# Patient Record
Sex: FEMALE | Race: WHITE | NOT HISPANIC OR LATINO | URBAN - METROPOLITAN AREA
[De-identification: names, ages, dates, MRNs, and addresses within clinical notes are randomized per-mention and may not be internally consistent; named-entity substitution may affect disease eponyms.]

---

## 2018-03-14 ENCOUNTER — IMPORTED ENCOUNTER (OUTPATIENT)
Dept: URBAN - METROPOLITAN AREA CLINIC 31 | Facility: CLINIC | Age: 73
End: 2018-03-14

## 2018-03-14 PROBLEM — H10.403: Noted: 2018-03-14

## 2018-03-14 PROBLEM — H25.13: Noted: 2018-03-14

## 2018-03-14 PROCEDURE — 92004 COMPRE OPH EXAM NEW PT 1/>: CPT

## 2018-03-14 NOTE — PATIENT DISCUSSION
1.  Nuclear Sclerotic Cataract OU: Explained how cataracts can effect vision. Recommend clinical observation. The patient was advised to contact us if any change or worsening of vision. 2. Allergic Conjunctivitis OU -- The condition was  discussed with the patient. Avoidance of allergens and cool compresses were recommended. [place] PRN. 3. Return for an appointment in 1 year for comprehensive exam. with Dr. Ashely Prather

## 2019-04-03 ENCOUNTER — IMPORTED ENCOUNTER (OUTPATIENT)
Dept: URBAN - METROPOLITAN AREA CLINIC 31 | Facility: CLINIC | Age: 74
End: 2019-04-03

## 2019-04-03 PROBLEM — H25.13: Noted: 2019-04-03

## 2019-04-03 PROCEDURE — 92014 COMPRE OPH EXAM EST PT 1/>: CPT

## 2020-09-02 ENCOUNTER — IMPORTED ENCOUNTER (OUTPATIENT)
Dept: URBAN - METROPOLITAN AREA CLINIC 31 | Facility: CLINIC | Age: 75
End: 2020-09-02

## 2020-09-02 PROBLEM — H25.13: Noted: 2020-09-02

## 2020-09-02 PROCEDURE — 92014 COMPRE OPH EXAM EST PT 1/>: CPT

## 2020-09-02 NOTE — PATIENT DISCUSSION
1.  Nuclear Sclerotic Cataract OU: Explained how cataracts can effect vision. Recommend clinical observation. The patient was advised to contact us if any change or worsening of vision. 2. Refractive error - Glasses change optional. 3.  Return for an appointment in 1 year for comprehensive exam. with Dr. Eric Sanchez.

## 2021-10-18 ENCOUNTER — IMPORTED ENCOUNTER (OUTPATIENT)
Dept: URBAN - METROPOLITAN AREA CLINIC 31 | Facility: CLINIC | Age: 76
End: 2021-10-18

## 2021-10-18 PROBLEM — H25.13: Noted: 2021-10-18

## 2021-10-18 PROCEDURE — 99214 OFFICE O/P EST MOD 30 MIN: CPT

## 2021-10-18 PROCEDURE — 92015 DETERMINE REFRACTIVE STATE: CPT

## 2021-10-18 NOTE — PATIENT DISCUSSION
1.  Nuclear Sclerotic Cataract OU: Explained how cataracts can effect vision. Recommend clinical observation. The patient was advised to contact us if any change or worsening of vision. 2. Refractive error - Glasses change optional. 3.  Return for an appointment in 1 year for comprehensive exam. with Dr. Pankaj Schrader

## 2021-10-18 NOTE — PATIENT DISCUSSION
1.  Nuclear Sclerotic Cataract OU: Monitor. 2. Refractive error - Change glasses. 3. Return for an appointment in 1 year for comprehensive exam. with Dr. Vidal Ortega.

## 2022-04-02 ASSESSMENT — VISUAL ACUITY
OD_CC: 20/30
OD_SC: 20/30-1
OD_PH: CC 20/25 -3
OD_SC: 20/30-2
OU_CC: J1+-1
OS_SC: 20/25
OS_SC: 20/40
OD_SC: 20/200
OD_CC: 20/40
OD_GLARE: 20/60MED
OS_CC: 20/50
OS_CC: 20/25
OD_CC: 20/25
OS_CC: 20/30
OS_GLARE: 20/80MED
OD_SC: 20/25
OS_SC: 20/30
OS_SC: 20/30-2
OU_SC: 20/20
OU_SC: 20/40

## 2022-04-02 ASSESSMENT — TONOMETRY
OS_IOP_MMHG: 14
OD_IOP_MMHG: 15
OD_IOP_MMHG: 14
OD_IOP_MMHG: 13
OS_IOP_MMHG: 16
OS_IOP_MMHG: 14
OD_IOP_MMHG: 13
OS_IOP_MMHG: 14

## 2022-10-14 ENCOUNTER — PREPPED CHART (OUTPATIENT)
Dept: URBAN - METROPOLITAN AREA CLINIC 29 | Facility: CLINIC | Age: 77
End: 2022-10-14

## 2022-10-18 ENCOUNTER — COMPREHENSIVE EXAM (OUTPATIENT)
Dept: URBAN - METROPOLITAN AREA CLINIC 29 | Facility: CLINIC | Age: 77
End: 2022-10-18

## 2022-10-18 DIAGNOSIS — H40.033: ICD-10-CM

## 2022-10-18 DIAGNOSIS — H25.13: ICD-10-CM

## 2022-10-18 PROCEDURE — 99214 OFFICE O/P EST MOD 30 MIN: CPT

## 2022-10-18 PROCEDURE — 92132 CPTRZD OPH DX IMG ANT SGM: CPT

## 2022-10-18 ASSESSMENT — TONOMETRY
OD_IOP_MMHG: 13
OS_IOP_MMHG: 13

## 2022-10-18 ASSESSMENT — VISUAL ACUITY
OD_CC: 20/40
OS_CC: 20/20

## 2022-10-26 ENCOUNTER — ESTABLISHED PATIENT (OUTPATIENT)
Dept: URBAN - METROPOLITAN AREA CLINIC 29 | Facility: CLINIC | Age: 77
End: 2022-10-26

## 2022-10-26 DIAGNOSIS — H40.033: ICD-10-CM

## 2022-10-26 DIAGNOSIS — H25.13: ICD-10-CM

## 2022-10-26 PROCEDURE — 99213 OFFICE O/P EST LOW 20 MIN: CPT

## 2022-10-26 ASSESSMENT — TONOMETRY
OS_IOP_MMHG: 12
OD_IOP_MMHG: 13

## 2022-10-26 ASSESSMENT — VISUAL ACUITY
OD_CC: 20/30+1
OS_CC: 20/20

## 2022-11-04 ENCOUNTER — PREPPED CHART (OUTPATIENT)
Dept: URBAN - METROPOLITAN AREA CLINIC 29 | Facility: CLINIC | Age: 77
End: 2022-11-04

## 2022-11-07 ENCOUNTER — SURGERY/PROCEDURE (OUTPATIENT)
Dept: URBAN - METROPOLITAN AREA SURGERY 17 | Facility: SURGERY | Age: 77
End: 2022-11-07

## 2022-11-07 DIAGNOSIS — H40.031: ICD-10-CM

## 2022-11-07 PROBLEM — Z98.890: Noted: 2022-11-07

## 2022-11-07 PROBLEM — Z98.890: Noted: 2022-11-14

## 2022-11-07 PROCEDURE — 66761 REVISION OF IRIS: CPT

## 2022-11-14 ENCOUNTER — SURGERY/PROCEDURE (OUTPATIENT)
Dept: URBAN - METROPOLITAN AREA SURGERY 17 | Facility: SURGERY | Age: 77
End: 2022-11-14

## 2022-11-14 DIAGNOSIS — H40.032: ICD-10-CM

## 2022-11-14 PROBLEM — Z98.890: Noted: 2022-11-07

## 2022-11-14 PROBLEM — Z98.890: Noted: 2022-11-14

## 2022-11-14 PROCEDURE — 66761 REVISION OF IRIS: CPT

## 2022-11-23 ENCOUNTER — POST-OP (OUTPATIENT)
Dept: URBAN - METROPOLITAN AREA CLINIC 29 | Facility: CLINIC | Age: 77
End: 2022-11-23

## 2022-11-23 DIAGNOSIS — Z98.890: ICD-10-CM

## 2022-11-23 PROCEDURE — 99024 POSTOP FOLLOW-UP VISIT: CPT

## 2022-11-23 ASSESSMENT — VISUAL ACUITY
OS_CC: 20/25
OD_CC: 20/20
OS_CC: 20/20
OD_CC: 20/30

## 2023-10-30 ENCOUNTER — COMPREHENSIVE EXAM (OUTPATIENT)
Dept: URBAN - METROPOLITAN AREA CLINIC 29 | Facility: CLINIC | Age: 78
End: 2023-10-30

## 2023-10-30 DIAGNOSIS — H25.13: ICD-10-CM

## 2023-10-30 DIAGNOSIS — Z98.890: ICD-10-CM

## 2023-10-30 PROCEDURE — 99214 OFFICE O/P EST MOD 30 MIN: CPT

## 2023-10-30 ASSESSMENT — VISUAL ACUITY
OD_CC: 20/20
OD_CC: 20/30
OS_CC: 20/30+1
OS_CC: 20/20

## 2023-10-30 ASSESSMENT — TONOMETRY
OS_IOP_MMHG: 11
OD_IOP_MMHG: 12

## 2024-10-31 ENCOUNTER — COMPREHENSIVE EXAM (OUTPATIENT)
Dept: URBAN - METROPOLITAN AREA CLINIC 29 | Facility: CLINIC | Age: 79
End: 2024-10-31

## 2024-10-31 DIAGNOSIS — H25.13: ICD-10-CM

## 2024-10-31 DIAGNOSIS — Z98.890: ICD-10-CM

## 2024-10-31 PROCEDURE — 92014 COMPRE OPH EXAM EST PT 1/>: CPT
